# Patient Record
Sex: FEMALE | Race: WHITE
[De-identification: names, ages, dates, MRNs, and addresses within clinical notes are randomized per-mention and may not be internally consistent; named-entity substitution may affect disease eponyms.]

---
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
## 2024-02-22 ENCOUNTER — OFFICE VISIT (OUTPATIENT)
Dept: OBSTETRICS AND GYNECOLOGY | Facility: CLINIC | Age: 19
End: 2024-02-22
Payer: COMMERCIAL

## 2024-02-22 VITALS
SYSTOLIC BLOOD PRESSURE: 118 MMHG | DIASTOLIC BLOOD PRESSURE: 62 MMHG | BODY MASS INDEX: 23.43 KG/M2 | WEIGHT: 145.8 LBS | HEIGHT: 66 IN

## 2024-02-22 DIAGNOSIS — Z11.3 SCREENING FOR STD (SEXUALLY TRANSMITTED DISEASE): ICD-10-CM

## 2024-02-22 DIAGNOSIS — N91.2 AMENORRHEA: ICD-10-CM

## 2024-02-22 DIAGNOSIS — Z30.42 ENCOUNTER FOR DEPO-PROVERA CONTRACEPTION: Primary | ICD-10-CM

## 2024-02-22 LAB
B-HCG UR QL: NEGATIVE
EXPIRATION DATE: NORMAL
INTERNAL NEGATIVE CONTROL: NEGATIVE
INTERNAL POSITIVE CONTROL: POSITIVE
Lab: NORMAL

## 2024-02-22 RX ORDER — MEDROXYPROGESTERONE ACETATE 150 MG/ML
150 INJECTION, SUSPENSION INTRAMUSCULAR ONCE
Status: COMPLETED | OUTPATIENT
Start: 2024-02-22 | End: 2024-02-22

## 2024-02-22 RX ADMIN — MEDROXYPROGESTERONE ACETATE 150 MG: 150 INJECTION, SUSPENSION INTRAMUSCULAR at 14:40

## 2024-02-22 NOTE — PROGRESS NOTES
"        Chief Complaint   Patient presents with    Gynecologic Exam    depo shot           Subjective   HPI  Laura Berkowitz is a 18 y.o. female, , No LMP recorded (lmp unknown). Patient has had an injection. who presents for routine follow up on her birth control. She is currently using birth control for contraception.    With her Depo Provera her periods are typically  absent . She reports occasional spotting that is dark in color and occasionally more red. The patient's contraceptive methods: Depo-Provera injections.  She is satisfied with her current method of birth control. Marital Status: single. She is sexually active. She has not had new partners. STD testing recommendations have been explained to the patient and she desires STD testing.    The patient would like to discuss the following complaints today: would like to do a urine pregnancy test today just to be safe. UPT in office negative today.     Additional OB/GYN History     OB History          0    Para   0    Term   0       0    AB   0    Living   0         SAB   0    IAB   0    Ectopic   0    Molar   0    Multiple   0    Live Births   0                The additional following portions of the patient's history were reviewed and updated as appropriate: allergies, current medications, past family history, past medical history, past social history, and past surgical history.    Review of Systems   Constitutional: Negative.    Respiratory: Negative.     Cardiovascular: Negative.    Gastrointestinal: Negative.    Genitourinary: Negative.        I have reviewed and agree with the HPI, ROS, and historical information as entered above. Nicole Mendez, APRN      Objective   /62   Ht 167.6 cm (65.98\")   Wt 66.1 kg (145 lb 12.8 oz)   LMP  (LMP Unknown)   BMI 23.55 kg/m²     Physical Exam  Vitals and nursing note reviewed. Exam conducted with a chaperone present.   Constitutional:       Appearance: Normal appearance.   Pulmonary: "      Effort: Pulmonary effort is normal.   Abdominal:      Palpations: Abdomen is soft.   Genitourinary:     Labia:         Right: No rash, tenderness or lesion.         Left: No rash, tenderness or lesion.       Vagina: Normal. No lesions.      Cervix: No cervical motion tenderness, discharge, lesion or cervical bleeding.      Uterus: Normal. Not enlarged, not fixed and not tender.       Adnexa:         Right: No mass or tenderness.          Left: No mass or tenderness.        Rectum: No external hemorrhoid.      Comments: Chaperone Present  Neurological:      Mental Status: She is alert.         Assessment & Plan     Assessment     Problem List Items Addressed This Visit    None  Visit Diagnoses       Encounter for Depo-Provera contraception    -  Primary    Relevant Medications    medroxyPROGESTERone (DEPO-PROVERA) injection 150 mg (Completed) (Start on 2/22/2024  3:15 PM)    Screening for STD (sexually transmitted disease)        Relevant Orders    Chlamydia trachomatis, Neisseria gonorrhoeae, Trichomonas vaginalis, PCR - Swab, Cervix    Amenorrhea        Relevant Orders    POC Pregnancy, Urine (Completed)            Counseling on alternative methods of birth control provided  Counseling on prevention of sexually transmitted diseases provided  Reviewed importance of medication compliance  and safe sex  Advised regular physical activity and daily MVI. Discussed nexplanon and information given should she want to change methods.       Nicole Mendez, APRN  02/22/2024     DepoProvera Contraception Note  Laura Berkowitz is a 18 y.o. female here for her DepoProvera injection. Her LMP was N/A.  she is 65.98 inches tall. Laura Berkowitz is sexually active. The patient's last pap smear was never due to age. Her last injection was 11/30/23. Her past reactions to this injection include: none . It has been exactly 12 weeks since her last injection. Her last annual exam was today 2/22/24.    Patient Questions  Have you ever  passed out from an injection? No     Have you ever had a reaction to an injection? No    Are you allergic to any medications? No    Have you been sick in the last month? No    Injection Details  Noted in the MAR.    Next Injection  Her next injection is 5/16/24.    Electronically Signed By:  MICHAEL Sharma  02/22/2024

## 2024-05-16 ENCOUNTER — CLINICAL SUPPORT (OUTPATIENT)
Dept: OBSTETRICS AND GYNECOLOGY | Facility: CLINIC | Age: 19
End: 2024-05-16
Payer: COMMERCIAL

## 2024-05-16 VITALS
BODY MASS INDEX: 24.17 KG/M2 | SYSTOLIC BLOOD PRESSURE: 112 MMHG | WEIGHT: 150.4 LBS | DIASTOLIC BLOOD PRESSURE: 70 MMHG | HEIGHT: 66 IN

## 2024-05-16 DIAGNOSIS — Z30.42 ENCOUNTER FOR DEPO-PROVERA CONTRACEPTION: Primary | ICD-10-CM

## 2024-05-16 RX ORDER — MEDROXYPROGESTERONE ACETATE 150 MG/ML
150 INJECTION, SUSPENSION INTRAMUSCULAR ONCE
Status: COMPLETED | OUTPATIENT
Start: 2024-05-16 | End: 2024-05-16

## 2024-05-16 RX ADMIN — MEDROXYPROGESTERONE ACETATE 150 MG: 150 INJECTION, SUSPENSION INTRAMUSCULAR at 14:40

## 2024-05-16 NOTE — PROGRESS NOTES
DepoProvera Contraception Note  Laura Berkowitz is a 19 y.o. female here for her DepoProvera injection. Her LMP was unknown, absent with Depo. She weighs 150.4 lbs and she is 65 inches tall. Laura Berkowitz is sexually active. The patient's last pap smear was never due to age. Her last injection was 2/22/24. Her past reactions to this injection include: none . It has been exactly 12 weeks since her last injection. Her last annual exam was 2/22/24.    Patient Questions  Have you ever passed out from an injection? No     Have you ever had a reaction to an injection? No    Are you allergic to any medications? No    Have you been sick in the last month? No    Injection Details  Noted in the MAR.    Next Injection  Her next injection is 8/1/2024 -8/15/2024.    Electronically Signed By:  Sagrario Anguiano RN  05/16/2024

## 2024-08-08 ENCOUNTER — CLINICAL SUPPORT (OUTPATIENT)
Dept: OBSTETRICS AND GYNECOLOGY | Facility: CLINIC | Age: 19
End: 2024-08-08
Payer: COMMERCIAL

## 2024-08-08 VITALS
SYSTOLIC BLOOD PRESSURE: 98 MMHG | DIASTOLIC BLOOD PRESSURE: 68 MMHG | HEIGHT: 66 IN | BODY MASS INDEX: 23.82 KG/M2 | WEIGHT: 148.2 LBS

## 2024-08-08 DIAGNOSIS — Z30.42 ENCOUNTER FOR DEPO-PROVERA CONTRACEPTION: Primary | ICD-10-CM

## 2024-08-08 RX ORDER — MEDROXYPROGESTERONE ACETATE 150 MG/ML
150 INJECTION, SUSPENSION INTRAMUSCULAR ONCE
Status: COMPLETED | OUTPATIENT
Start: 2024-08-08 | End: 2024-08-08

## 2024-08-08 RX ADMIN — MEDROXYPROGESTERONE ACETATE 150 MG: 150 INJECTION, SUSPENSION INTRAMUSCULAR at 09:51

## 2024-08-08 NOTE — PROGRESS NOTES
DepoProvera Contraception Note  Laura Berkowitz is a 19 y.o. female here for her DepoProvera injection. Her LMP was unknown, absent with Depo . She weighs 148lbs and she is 5feet 5inches tall. Laura Berkowitz is sexually active. The patient's last pap smear was never due to age. Her last injection was 05/16/2024. Her past reactions to this injection include: None . It has been 12 weeks since her last injection. Her last annual exam was 02/22/2024.    Patient Questions  Have you ever passed out from an injection? No     Have you ever had a reaction to an injection? No    Are you allergic to any medications? No    Have you been sick in the last month? No    Injection Details  Noted in the MAR.    Next Injection  Her next injection is 10/31/2024.    Electronically Signed By:  Mehreen Rubi MA  08/08/2024

## 2025-02-24 ENCOUNTER — CLINICAL SUPPORT (OUTPATIENT)
Dept: OBSTETRICS AND GYNECOLOGY | Facility: CLINIC | Age: 20
End: 2025-02-24
Payer: COMMERCIAL

## 2025-02-24 VITALS
SYSTOLIC BLOOD PRESSURE: 118 MMHG | BODY MASS INDEX: 24.04 KG/M2 | HEIGHT: 66 IN | DIASTOLIC BLOOD PRESSURE: 68 MMHG | WEIGHT: 149.6 LBS

## 2025-02-24 DIAGNOSIS — Z30.42 ENCOUNTER FOR DEPO-PROVERA CONTRACEPTION: Primary | ICD-10-CM

## 2025-02-24 RX ORDER — MEDROXYPROGESTERONE ACETATE 150 MG/ML
150 INJECTION, SUSPENSION INTRAMUSCULAR ONCE
Status: COMPLETED | OUTPATIENT
Start: 2025-02-24 | End: 2025-02-24

## 2025-02-24 RX ADMIN — MEDROXYPROGESTERONE ACETATE 150 MG: 150 INJECTION, SUSPENSION INTRAMUSCULAR at 16:20

## 2025-02-24 NOTE — PROGRESS NOTES
DepoProvera Contraception Note  Laura Berkowitz is a 19 y.o. female here for her DepoProvera injection. Her LMP was 11/2024 approx. She weighs 149.6 lbs and she is 65.98 inches tall. Laura Berkowitz is sexually active. The patient's last pap smear was never due to age. Her last injection was 8/8/24 . She wishes to restart her Depo today. UPT in the office today was negative and she denies any unprotected intercourse in the past 2 weeks. Her past reactions to this injection include: none  . It has been 12 weeks since her last injection. Her last annual exam was 2/22/24.    Patient Questions  Have you ever passed out from an injection? No     Have you ever had a reaction to an injection? No    Are you allergic to any medications? No    Have you been sick in the last month? No    Injection Details  Noted in the MAR.    Next Injection  Her next injection is 5/12/25-5/26/25.    Electronically Signed By:  Sagrario Anguiano RN  02/24/2025

## 2025-05-19 ENCOUNTER — OFFICE VISIT (OUTPATIENT)
Dept: OBSTETRICS AND GYNECOLOGY | Facility: CLINIC | Age: 20
End: 2025-05-19
Payer: COMMERCIAL

## 2025-05-19 VITALS
BODY MASS INDEX: 24.01 KG/M2 | SYSTOLIC BLOOD PRESSURE: 120 MMHG | DIASTOLIC BLOOD PRESSURE: 70 MMHG | HEIGHT: 66 IN | WEIGHT: 149.4 LBS

## 2025-05-19 DIAGNOSIS — Z01.419 WOMEN'S ANNUAL ROUTINE GYNECOLOGICAL EXAMINATION: ICD-10-CM

## 2025-05-19 DIAGNOSIS — Z30.42 DEPO-PROVERA CONTRACEPTIVE STATUS: ICD-10-CM

## 2025-05-19 DIAGNOSIS — Z23 NEED FOR VACCINATION: Primary | ICD-10-CM

## 2025-05-19 DIAGNOSIS — Z11.3 SCREENING FOR STD (SEXUALLY TRANSMITTED DISEASE): ICD-10-CM

## 2025-05-19 DIAGNOSIS — Z12.4 SCREENING FOR CERVICAL CANCER: ICD-10-CM

## 2025-05-19 RX ORDER — MEDROXYPROGESTERONE ACETATE 150 MG/ML
150 INJECTION, SUSPENSION INTRAMUSCULAR ONCE
Status: COMPLETED | OUTPATIENT
Start: 2025-05-19 | End: 2025-05-19

## 2025-05-19 RX ORDER — MEDROXYPROGESTERONE ACETATE 150 MG/ML
150 INJECTION, SUSPENSION INTRAMUSCULAR
COMMUNITY

## 2025-05-19 RX ADMIN — MEDROXYPROGESTERONE ACETATE 150 MG: 150 INJECTION, SUSPENSION INTRAMUSCULAR at 10:05

## 2025-05-19 NOTE — PROGRESS NOTES
Gynecologic Annual Exam Note        Gynecologic Exam and Depo shot         Subjective     HPI  Laura Berkowitz is a 20 y.o.  female who presents for annual well woman exam as a established patient. There were no changes to her medical or surgical history since her last visit.. No LMP recorded (lmp unknown). Patient has had an injection. Her periods are absent secondary to birth control. Marital Status: single.  She is sexually active. She has not had new partners. STD testing recommendations have been explained to the patient and she does desire STD testing.    The patient would like to discuss the following complaints today: none     Additional OB/GYN History   contraceptive methods: Depo-Provera injections  Desires to: continue contraception    History of STD: no    Last Pap : never.   Last Completed Pap Smear    This patient has no relevant Health Maintenance data.          History of abnormal Pap smear:  N/A  Gardasil status: none, may be interested in   Family history of uterine, colon, breast, or ovarian cancer: yes - breast cancer- maternal grandmother  Performs monthly Self-Breast Exam: yes  Exercises Regularly:yes  Feelings of Anxiety or Depression: no  Tobacco Usage?: No       Current Outpatient Medications:     medroxyPROGESTERone (DEPO-PROVERA) 150 MG/ML injection, Inject 1 mL into the appropriate muscle as directed by prescriber Every 3 (Three) Months., Disp: , Rfl:      Patient is requesting refills of Depo Provera .    OB History          0    Para   0    Term   0       0    AB   0    Living   0         SAB   0    IAB   0    Ectopic   0    Molar   0    Multiple   0    Live Births   0                Health Maintenance   Topic Date Due    HPV VACCINES (1 - 3-dose series) Never done    MENINGOCOCCAL B VACCINE (1 of 2 - Standard) Never done    HEPATITIS C SCREENING  Never done    ANNUAL PHYSICAL  Never done    TDAP/TD VACCINES (1 - Tdap) Never done    Annual Gynecologic Pelvic and  "Breast Exam  10/07/2023    COVID-19 Vaccine (1 - 2024-25 season) Never done    CHLAMYDIA SCREENING  02/22/2025    PAP SMEAR  Never done    INFLUENZA VACCINE  07/01/2025    MENINGOCOCCAL VACCINE  Completed    Pneumococcal Vaccine 0-49  Aged Out       History reviewed. No pertinent past medical history.     History reviewed. No pertinent surgical history.    The additional following portions of the patient's history were reviewed and updated as appropriate: allergies, current medications, past family history, past medical history, past social history, and past surgical history.    Review of Systems   Constitutional: Negative.    Respiratory: Negative.     Cardiovascular: Negative.    Gastrointestinal: Negative.    Genitourinary: Negative.    Psychiatric/Behavioral: Negative.           I have reviewed and agree with the HPI, ROS, and historical information as entered above. Nicole Mendez, APRN          Objective   /70   Ht 167.6 cm (65.98\")   Wt 67.8 kg (149 lb 6.4 oz)   LMP  (LMP Unknown)   BMI 24.13 kg/m²     Physical Exam  Constitutional:       Appearance: Normal appearance.   Neck:      Thyroid: No thyroid mass or thyromegaly.   Pulmonary:      Effort: Pulmonary effort is normal.   Chest:      Chest wall: No mass.   Breasts:     Right: Normal. No inverted nipple, mass, nipple discharge or skin change.      Left: Normal. No inverted nipple, mass, nipple discharge or skin change.   Abdominal:      General: There is no distension.      Palpations: Abdomen is soft. There is no mass.      Tenderness: There is no abdominal tenderness.      Hernia: No hernia is present.   Genitourinary:     General: Normal vulva.      Labia:         Right: No rash.         Left: No rash.       Vagina: Normal.      Cervix: No cervical motion tenderness or lesion.      Uterus: Normal.       Adnexa: Right adnexa normal and left adnexa normal.        Right: No mass or tenderness.          Left: No mass or tenderness.   " "  Neurological:      Mental Status: She is alert.            Assessment and Plan    Problem List Items Addressed This Visit    None  Visit Diagnoses         Need for vaccination    -  Primary      Depo-Provera contraceptive status          Screening for STD (sexually transmitted disease)          Screening for cervical cancer          Women's annual routine gynecological examination              She is happy with depo, we discussed potential bone loss with long term use and I advised MVI. Will plan bone density screening next year. We discussed alternative options and she declines for now.     GYN annual well woman exam.   Reviewed pap guidelines.   Gardasil # 1 today  Encouraged use of condoms for STD prevention.  Reviewed monthly self breast exams.  Instructed to call with lumps, pain, or breast discharge.    Return in about 1 year (around 5/19/2026) for Annual physical.    Nicole Mendez, MICHAEL  05/19/2025           DepoProvera Contraception Note  Laura Berkowitz is a 20 y.o. female here for her DepoProvera injection. Her LMP was unknown (absent with Depo). She weighs 149 lbs and she is 65.98\" inches tall. Laura Berkowitz is sexually active. The patient's last pap smear was today (first one). Her last injection was 2/24/25. Her past reactions to this injection include: none . It has been 12 weeks since her last injection. Her last annual exam was 2/22/24.    Patient Questions  Have you ever passed out from an injection? No     Have you ever had a reaction to an injection? No    Are you allergic to any medications? No    Have you been sick in the last month? No    Injection Details  Noted in the MAR.    Next Injection  Her next injection is 8/4/25-8/18/25.    Electronically Signed By:  Nicole Mendez, MICHAEL  05/19/2025     First HPV Injection Note  Laura Berkowitz is a 20 y.o. female here for counseling on HPV vaccination. Her LMP was unknown ( absent with Depo). She weighs 149 lbs and she is 65 inches tall. Laura" Woo is sexually active. The patient's last pap smear was today ( first one) . The results were pending.     She has received and reviewed the Gardasil Information Sheet. Contraindications have been reviewed.    Patient does not have cancer, leukemia, AIDS, or any other immune system problems. She has not received a blood transfusion, blood products, or immune gamma globulin in the last 6 months. She does not take cortisone, prednisone, steroid drugs, anticancer drugs, or had recent x-ray treatments. She has not received any vaccinations in the last 4 weeks. She has been counseled not to get pregnant in the next 6 months. She is using Depo injections for birth control.      Patient Questions  Have you been sick in the last 36 hours? No    Have you ever passed out from an injection? No     Have you ever had a reaction to a previous vaccination? No    Are you allergic to any medications? No    Are you allergic to any foods? No    Are you allergic to any vaccines? No    Have you been sick in the last month? No    Information Reviewed  Sagrario Anguiano RN reviewed the possible side effects of pain, swelling, itching at the injection site, fever, difficulty breathing and raely anaphylactic reactions. The visit involved  10 mins  of counseling on STD prevention and vaccine benefits.    Injection Details  Noted in the Immunization Activity.    Future Injections  Her next injection is in 2 months: 7/15/25. The date of the third injection will be in 6 months: 11/2025    Toleration  Patient tolerated the injection well with no problems.    Electronically Signed By:  Sagrario Anguiano RN  05/19/2025

## 2025-05-27 LAB — REF LAB TEST METHOD: NORMAL

## 2025-07-21 ENCOUNTER — CLINICAL SUPPORT (OUTPATIENT)
Dept: OBSTETRICS AND GYNECOLOGY | Facility: CLINIC | Age: 20
End: 2025-07-21
Payer: COMMERCIAL

## 2025-07-21 DIAGNOSIS — Z23 NEED FOR VACCINATION: Primary | ICD-10-CM

## 2025-08-18 ENCOUNTER — CLINICAL SUPPORT (OUTPATIENT)
Dept: OBSTETRICS AND GYNECOLOGY | Facility: CLINIC | Age: 20
End: 2025-08-18
Payer: COMMERCIAL

## 2025-08-18 DIAGNOSIS — Z30.42 DEPO-PROVERA CONTRACEPTIVE STATUS: Primary | ICD-10-CM

## 2025-08-18 RX ORDER — MEDROXYPROGESTERONE ACETATE 150 MG/ML
150 INJECTION, SUSPENSION INTRAMUSCULAR ONCE
Status: COMPLETED | OUTPATIENT
Start: 2025-08-18 | End: 2025-08-18

## 2025-08-18 RX ADMIN — MEDROXYPROGESTERONE ACETATE 150 MG: 150 INJECTION, SUSPENSION INTRAMUSCULAR at 14:43
